# Patient Record
Sex: MALE | Race: WHITE | NOT HISPANIC OR LATINO | Employment: FULL TIME | URBAN - METROPOLITAN AREA
[De-identification: names, ages, dates, MRNs, and addresses within clinical notes are randomized per-mention and may not be internally consistent; named-entity substitution may affect disease eponyms.]

---

## 2021-12-10 ENCOUNTER — HOSPITAL ENCOUNTER (EMERGENCY)
Facility: CLINIC | Age: 51
Discharge: HOME | End: 2021-12-10
Attending: EMERGENCY MEDICINE | Admitting: EMERGENCY MEDICINE
Payer: COMMERCIAL

## 2021-12-10 ENCOUNTER — APPOINTMENT (OUTPATIENT)
Dept: RADIOLOGY | Facility: CLINIC | Age: 51
End: 2021-12-10
Attending: STUDENT IN AN ORGANIZED HEALTH CARE EDUCATION/TRAINING PROGRAM
Payer: COMMERCIAL

## 2021-12-10 VITALS
WEIGHT: 308 LBS | OXYGEN SATURATION: 95 % | DIASTOLIC BLOOD PRESSURE: 108 MMHG | TEMPERATURE: 97 F | HEART RATE: 98 BPM | RESPIRATION RATE: 18 BRPM | SYSTOLIC BLOOD PRESSURE: 190 MMHG

## 2021-12-10 DIAGNOSIS — R07.89 ACUTE CHEST WALL PAIN: ICD-10-CM

## 2021-12-10 PROBLEM — G47.33 OBSTRUCTIVE SLEEP APNEA: Status: ACTIVE | Noted: 2017-06-12

## 2021-12-10 LAB — D DIMER PPP FEU-MCNC: 0.35 UG/ML FEU (ref 0–0.5)

## 2021-12-10 PROCEDURE — 85379 FIBRIN DEGRADATION QUANT: CPT | Performed by: EMERGENCY MEDICINE

## 2021-12-10 PROCEDURE — 99284 EMERGENCY DEPT VISIT MOD MDM: CPT | Mod: 25

## 2021-12-10 PROCEDURE — 71046 X-RAY EXAM CHEST 2 VIEWS: CPT

## 2021-12-10 PROCEDURE — 36415 COLL VENOUS BLD VENIPUNCTURE: CPT | Performed by: EMERGENCY MEDICINE

## 2021-12-10 PROCEDURE — 250N000013 HC RX MED GY IP 250 OP 250 PS 637: Performed by: EMERGENCY MEDICINE

## 2021-12-10 PROCEDURE — 96372 THER/PROPH/DIAG INJ SC/IM: CPT | Performed by: EMERGENCY MEDICINE

## 2021-12-10 PROCEDURE — 250N000011 HC RX IP 250 OP 636: Performed by: EMERGENCY MEDICINE

## 2021-12-10 RX ORDER — IBUPROFEN 800 MG/1
800 TABLET, FILM COATED ORAL EVERY 8 HOURS PRN
Qty: 15 TABLET | Refills: 0 | Status: SHIPPED | OUTPATIENT
Start: 2021-12-10 | End: 2021-12-18

## 2021-12-10 RX ORDER — KETOROLAC TROMETHAMINE 30 MG/ML
30 INJECTION, SOLUTION INTRAMUSCULAR; INTRAVENOUS ONCE
Status: COMPLETED | OUTPATIENT
Start: 2021-12-10 | End: 2021-12-10

## 2021-12-10 RX ORDER — HYDROCODONE BITARTRATE AND ACETAMINOPHEN 5; 325 MG/1; MG/1
1 TABLET ORAL EVERY 4 HOURS PRN
Qty: 10 TABLET | Refills: 0 | Status: SHIPPED | OUTPATIENT
Start: 2021-12-10 | End: 2021-12-13

## 2021-12-10 RX ORDER — OXYCODONE HYDROCHLORIDE 5 MG/1
5 TABLET ORAL ONCE
Status: COMPLETED | OUTPATIENT
Start: 2021-12-10 | End: 2021-12-10

## 2021-12-10 RX ADMIN — OXYCODONE HYDROCHLORIDE 5 MG: 5 TABLET ORAL at 21:37

## 2021-12-10 RX ADMIN — KETOROLAC TROMETHAMINE 30 MG: 30 INJECTION, SOLUTION INTRAMUSCULAR at 21:37

## 2021-12-10 ASSESSMENT — ENCOUNTER SYMPTOMS
SHORTNESS OF BREATH: 0
COUGH: 0

## 2021-12-11 ASSESSMENT — ENCOUNTER SYMPTOMS
FEVER: 0
DIAPHORESIS: 0
VOMITING: 0
PALPITATIONS: 0
CHEST TIGHTNESS: 0
WHEEZING: 0
CHILLS: 0
ABDOMINAL PAIN: 0
NAUSEA: 0
DIARRHEA: 0
STRIDOR: 0

## 2021-12-11 NOTE — ED PROVIDER NOTES
Emergency Department Encounter      NAME: Brennan Miner  AGE: 51 year old male  YOB: 1970  MRN: [id number]  EVALUATION DATE & TIME: 12/10/2021  8:52 PM    PCP: Gerard Amin MD    ED PROVIDER: Hank Mcdaniel M.D.      Chief Complaint   Patient presents with     Pleurisy     Rib Pain       FINAL IMPRESSION:  1. Acute chest wall pain        MEDICAL DECISION MAKIN:16 PM I met with the patient, obtained history, performed an initial exam, and discussed options and plan for diagnostics and treatment here in the ED. PPE worn: N95, eye protection, gloves.  10:21 PM Rechecked patient and updated on results. Discussed plan for discharge.    This patient is a 51-year-old male who has a history of obesity and diabetes who presents with chest pain.  He says that earlier today he coughed and sneezed hard after aspirating some water.  He says he felt a pop in the right lower chest.  He has since had a sharp stabbing pain in this area.  He has a tender area on his chest.  The pain is worse with deep breaths but also with any sort of movement or palpation.  He says that he had a similar episode in the same area several weeks ago after coughing hard.  He has not been coughing up any phlegm.  He is not short of breath.  No leg swelling pain or redness.  No hemoptysis or syncope.  The patient was very tender over the lower lateral anterior chest wall.  There was no rash there but he does note that he had shingles several weeks ago on his left shoulder.  His lungs are clear to auscultation and he is satting well.  He had a nontender abdomen and flank.  A chest x-ray is done and was found to be negative.  There is no evidence of rib fracture, pleural effusion, pneumonia or pneumothorax.  He was given Toradol in the ER and this seemed to relieve his pain well.  From the sounds of the mechanism of injury and his exam it sounds as if the patient has a lower chest pain syndrome.     Pertinent Labs & Imaging  studies reviewed. (See chart for details)     The importance of close follow up was discussed. We reviewed warning signs and symptoms, and I instructed Mr. Miner to return to the emergency department immediately if he develops any new or worsening symptoms. I provided additional verbal discharge instructions. Mr. Miner expressed understanding and agreement with this plan of care, his questions were answered, and he was discharged in stable condition.       MEDICATIONS GIVEN IN THE EMERGENCY:  Medications   ketorolac (TORADOL) injection 30 mg (30 mg Intramuscular Given 12/10/21 2137)   oxyCODONE (ROXICODONE) tablet 5 mg (5 mg Oral Given 12/10/21 2137)       NEW PRESCRIPTIONS STARTED AT TODAY'S ER VISIT:  Discharge Medication List as of 12/10/2021 10:53 PM      START taking these medications    Details   HYDROcodone-acetaminophen (NORCO) 5-325 MG tablet Take 1 tablet by mouth every 4 hours as needed for pain, Disp-10 tablet, R-0, Local Print      ibuprofen (ADVIL/MOTRIN) 800 MG tablet Take 1 tablet (800 mg) by mouth every 8 hours as needed for moderate pain, Disp-15 tablet, R-0, Local Print           =================================================================    HPI    Patient information was obtained from: Patient    Use of : N/A      Brennan Miner is a 51 year old male with a past medical history of obesity, type II DM, who presents by private car for evaluation of right chest wall pain. Patient reports right lateral chest wall pain since this morning that began after he coughed and sneezed at the same time. Pain is sharp and stabbing. States he felt a pop when this occurred. He notes worsened pain with movement and deep breaths. Rates the pain at 9/10 at its worst. He last took ibuprofen at 12:00 PM without any relief. He denies any shortness of breath.    Patient states he thinks he pulled a muscle in the same area a few weeks ago from coughing. He denies any other productive cough. Denies any  rash. He reports a history of DM, but states he does not take any insulin or medication. He denies any other concerns.    REVIEW OF SYSTEMS  Review of Systems   Constitutional: Negative for chills, diaphoresis and fever.   Respiratory: Negative for cough (no productive cough), chest tightness, shortness of breath, wheezing and stridor.    Cardiovascular: Positive for chest pain. Negative for palpitations and leg swelling.   Gastrointestinal: Negative for abdominal pain, diarrhea, nausea and vomiting.   Musculoskeletal:        Right sided chest wall pain.   Skin: Negative for rash.   All other systems reviewed and are negative.      PAST MEDICAL HISTORY:  History reviewed. No pertinent past medical history.    PAST SURGICAL HISTORY:  Past Surgical History:   Procedure Laterality Date     HERNIA REPAIR, UMBILICAL  03/12/2012       CURRENT MEDICATIONS:    No current facility-administered medications for this encounter.    Current Outpatient Medications:      HYDROcodone-acetaminophen (NORCO) 5-325 MG tablet, Take 1 tablet by mouth every 4 hours as needed for pain, Disp: 10 tablet, Rfl: 0     ibuprofen (ADVIL/MOTRIN) 800 MG tablet, Take 1 tablet (800 mg) by mouth every 8 hours as needed for moderate pain, Disp: 15 tablet, Rfl: 0    ALLERGIES:  No Known Allergies    FAMILY HISTORY:  History reviewed. No pertinent family history.    SOCIAL HISTORY:   Social History     Socioeconomic History     Marital status:      Spouse name: Not on file     Number of children: Not on file     Years of education: Not on file     Highest education level: Not on file   Occupational History     Not on file   Tobacco Use     Smoking status: Not on file     Smokeless tobacco: Not on file   Substance and Sexual Activity     Alcohol use: Not on file     Drug use: Not on file     Sexual activity: Not on file   Other Topics Concern     Not on file   Social History Narrative     Not on file     Social Determinants of Health     Financial  Resource Strain: Not on file   Food Insecurity: Not on file   Transportation Needs: Not on file   Physical Activity: Not on file   Stress: Not on file   Social Connections: Not on file   Intimate Partner Violence: Not on file   Housing Stability: Not on file       PHYSICAL EXAM:    Vitals: BP (!) 190/108 (BP Location: Right arm)   Pulse 98   Temp 97  F (36.1  C) (Oral)   Resp 18   Wt 139.7 kg (308 lb)   SpO2 95%    Constitutional: Well developed, well nourished. Comfortable appearing.  HENT: Normocephalic, atraumatic, mucous membranes moist, nose normal.   Neck- Supple, gross ROM intact.   Eyes: Pupils mid-range, sclera white, no discharge  Respiratory: Clear to auscultation bilaterally, no respiratory distress, no wheezing, speaks full sentences easily.  Chest:tenderness to palpation over right lower lateral chest wall. No rash or crepitus.  Cardiovascular: Normal heart rate, regular rhythm, no murmurs. No lower extremity edema, 2+ DP pulses.   GI: Soft, no tenderness to deep palpation in all quadrants, no masses.  Musculoskeletal: Moving all 4 extremities intentionally and without pain. No obvious deformity. T  Skin: Warm, dry, no rash.  Neurologic: Alert & oriented x 3, speech clear, moving all extremities spontaneously   Psychiatric: Affect normal, cooperative.     LAB:  All pertinent labs reviewed and interpreted.  Labs Ordered and Resulted from Time of ED Arrival to Time of ED Departure   D DIMER QUANTITATIVE - Normal       Result Value    D-Dimer Quantitative 0.35         RADIOLOGY:  Chest XR,  PA & LAT   Final Result   IMPRESSION: Negative chest.        I, Arias Paul, am serving as a scribe to document services personally performed by Dr. Hank Mcdaniel based on my observation and the provider's statements to me. Hank CEDENO M.D. attest that Arias Paul is acting in a scribe capacity, has observed my performance of the services and has documented them in accordance with my  direction.      Hank Mcdaniel M.D.  Emergency Medicine  [facility] EMERGENCY ROOM  [address] [id number] [phone]  Dept: [phone]     Hank Mcdaniel MD  12/11/21 0024

## 2021-12-11 NOTE — ED TRIAGE NOTES
"Pt presents to the ED with c/o right sided chest/rib pain after pt \"sneezed and coughed\" and felt a pop in right side of chest. Since, pt has been having pain. Denies SOB or difficulty breathing.   "

## 2021-12-11 NOTE — ED NOTES
"Patient reports he coughed/sneezed earlier today and felt \"pop\" to right anterior/lateral chest. No visible injury or bruising. Reports he worked today ([employer]), felt worse as day progressed. Denies shortness of breath. Pain with deep inhalation. Reports this happened \"a couple weeks ago\" and it resolved itself. Denies cardiac history. Denies radiating pains.   "